# Patient Record
Sex: MALE | Race: BLACK OR AFRICAN AMERICAN | Employment: UNEMPLOYED | URBAN - METROPOLITAN AREA
[De-identification: names, ages, dates, MRNs, and addresses within clinical notes are randomized per-mention and may not be internally consistent; named-entity substitution may affect disease eponyms.]

---

## 2021-01-01 ENCOUNTER — HOSPITAL ENCOUNTER (EMERGENCY)
Age: 51
End: 2021-07-23
Attending: EMERGENCY MEDICINE
Payer: COMMERCIAL

## 2021-01-01 DIAGNOSIS — Z85.048 HISTORY OF COLORECTAL CANCER: ICD-10-CM

## 2021-01-01 DIAGNOSIS — I46.9 CARDIOPULMONARY ARREST (HCC): Primary | ICD-10-CM

## 2021-01-01 DIAGNOSIS — Z86.39 HISTORY OF DIABETES MELLITUS: ICD-10-CM

## 2021-01-01 PROCEDURE — 92950 HEART/LUNG RESUSCITATION CPR: CPT

## 2021-01-01 PROCEDURE — 74011000250 HC RX REV CODE- 250: Performed by: EMERGENCY MEDICINE

## 2021-01-01 PROCEDURE — 99284 EMERGENCY DEPT VISIT MOD MDM: CPT

## 2021-01-01 PROCEDURE — 74011250636 HC RX REV CODE- 250/636: Performed by: EMERGENCY MEDICINE

## 2021-01-01 PROCEDURE — 82947 ASSAY GLUCOSE BLOOD QUANT: CPT

## 2021-01-01 RX ORDER — ATROPINE SULFATE 0.1 MG/ML
INJECTION INTRAVENOUS
Status: COMPLETED | OUTPATIENT
Start: 2021-01-01 | End: 2021-01-01

## 2021-01-01 RX ORDER — SODIUM BICARBONATE 1 MEQ/ML
SYRINGE (ML) INTRAVENOUS
Status: COMPLETED | OUTPATIENT
Start: 2021-01-01 | End: 2021-01-01

## 2021-01-01 RX ORDER — EPINEPHRINE 0.1 MG/ML
INJECTION INTRACARDIAC; INTRAVENOUS
Status: COMPLETED | OUTPATIENT
Start: 2021-01-01 | End: 2021-01-01

## 2021-01-01 RX ORDER — MAGNESIUM SULFATE HEPTAHYDRATE 40 MG/ML
INJECTION, SOLUTION INTRAVENOUS
Status: COMPLETED | OUTPATIENT
Start: 2021-01-01 | End: 2021-01-01

## 2021-01-01 RX ORDER — CALCIUM CHLORIDE INJECTION 100 MG/ML
INJECTION, SOLUTION INTRAVENOUS
Status: COMPLETED | OUTPATIENT
Start: 2021-01-01 | End: 2021-01-01

## 2021-01-01 RX ORDER — AMIODARONE HYDROCHLORIDE 150 MG/3ML
INJECTION, SOLUTION INTRAVENOUS
Status: COMPLETED | OUTPATIENT
Start: 2021-01-01 | End: 2021-01-01

## 2021-01-01 RX ADMIN — EPINEPHRINE 1 MG: 0.1 INJECTION, SOLUTION ENDOTRACHEAL; INTRACARDIAC; INTRAVENOUS at 19:39

## 2021-01-01 RX ADMIN — MAGNESIUM SULFATE HEPTAHYDRATE 2 G: 40 INJECTION, SOLUTION INTRAVENOUS at 19:40

## 2021-01-01 RX ADMIN — AMIODARONE HYDROCHLORIDE 300 MG: 50 INJECTION, SOLUTION INTRAVENOUS at 19:31

## 2021-01-01 RX ADMIN — EPINEPHRINE 1 MG: 0.1 INJECTION, SOLUTION ENDOTRACHEAL; INTRACARDIAC; INTRAVENOUS at 19:33

## 2021-01-01 RX ADMIN — EPINEPHRINE 1 MG: 0.1 INJECTION, SOLUTION ENDOTRACHEAL; INTRACARDIAC; INTRAVENOUS at 19:29

## 2021-01-01 RX ADMIN — CALCIUM CHLORIDE 1 G: 100 INJECTION, SOLUTION INTRAVENOUS at 19:27

## 2021-01-01 RX ADMIN — EPINEPHRINE 1 MG: 0.1 INJECTION, SOLUTION ENDOTRACHEAL; INTRACARDIAC; INTRAVENOUS at 19:41

## 2021-01-01 RX ADMIN — EPINEPHRINE 1 MG: 0.1 INJECTION, SOLUTION ENDOTRACHEAL; INTRACARDIAC; INTRAVENOUS at 19:36

## 2021-01-01 RX ADMIN — SODIUM BICARBONATE 100 MEQ: 84 INJECTION INTRAVENOUS at 19:34

## 2021-01-01 RX ADMIN — SODIUM BICARBONATE 50 MEQ: 84 INJECTION INTRAVENOUS at 19:34

## 2021-01-01 RX ADMIN — ATROPINE SULFATE 1 MG: 0.1 INJECTION, SOLUTION ENDOTRACHEAL; INTRAMUSCULAR; INTRAVENOUS; SUBCUTANEOUS at 19:31

## 2021-01-01 RX ADMIN — SODIUM BICARBONATE 50 MEQ: 84 INJECTION INTRAVENOUS at 19:26

## 2021-01-01 RX ADMIN — EPINEPHRINE 1 MG: 0.1 INJECTION, SOLUTION ENDOTRACHEAL; INTRACARDIAC; INTRAVENOUS at 19:26

## 2021-07-22 NOTE — ED PROVIDER NOTES
[address]  EMERGENCY DEPARTMENT HISTORY AND PHYSICAL EXAM    7:51 PM    Date: 7/22/2021  Patient Name: Narciso Kowalski    History of Presenting Illness     Chief Complaint   Patient presents with    Cardiac arrest       History Provided By: Patient  Location/Duration/Severity/Modifying factors   Patient is a 44-year-old male with a past medical history of diabetes, colorectal cancer, status post bilateral BKA's as well as other noted past medical history who is presenting to the emergency department today with a chief complaint of cardiopulmonary arrest.  The patient reportedly was at home, per EMS patient was complaining of feeling unwell and per the family they indicated that the bystander indicated the patient's \"eyes rolled back\". Patient evidently came around and spoke with the patient. Indicated the patient was alert at that time. In route the patient had a further cardiac arrest and lost pulses. He underwent defibrillation on 2 occasions received 3 mg epinephrine and an advanced airway was inserted. History thus is further limited due to the patient being unresponsive. The family, in retrospect provided the history that the patient has been complaining of left thigh pain earlier in the day today. Per EMS the patient initially was in a pulseless electrical activity. PCP: Carlyn Sesay, DO    Current Outpatient Medications   Medication Sig Dispense Refill    metFORMIN (GLUCOPHAGE) 500 mg tablet Take 1 Tab by mouth two (2) times daily (with meals). 60 Tab 0       Past History     Past Medical History:  Past Medical History:   Diagnosis Date    Diabetes ([address]        Past Surgical History:  Past Surgical History:   Procedure Laterality Date    HX ORTHOPAEDIC      left knee, left elbow       Family History:  History reviewed. No pertinent family history.     Social History:  Social History     Tobacco Use    Smoking status: Never Smoker   Substance Use Topics    Alcohol use: No    Drug use: No       Allergies:  No Known Allergies    I reviewed and confirmed the above information with patient and updated as necessary. Review of Systems     Review of Systems   Unable to perform ROS: Patient unresponsive       Physical Exam   There were no vitals taken for this visit. Physical Exam  Constitutional:       Appearance: He is ill-appearing. Comments: Completely unresponsive, undergoing chest compressions ill-appearing   HENT:      Head: Normocephalic and atraumatic. Right Ear: External ear normal.      Left Ear: External ear normal.      Nose: Nose normal.      Mouth/Throat:      Comments: Advanced airway in place  Eyes:      Conjunctiva/sclera: Conjunctivae normal.      Comments: Pupils are approximately 5 mm and unreactive to light   Cardiovascular:      Pulses: Normal pulses. Comments: No cardiac activity was auscultated  Pulmonary:      Comments: Symmetric mechanical sounding breath sounds, no spontaneous respirations  Abdominal:      General: Abdomen is flat. Comments: Abdominal scar in the right upper quadrant, ostomy bag in the left lower quadrant   Musculoskeletal:         General: Normal range of motion. Cervical back: Neck supple. Skin:     General: Skin is warm and dry. Neurological:      Comments: Unresponsive, no gag, no corneal reflex. No spontaneous movement. GCS of 3. Diagnostic Study Results     Labs -  No results found for this or any previous visit (from the past 24 hour(s)). Radiologic Studies -   No orders to display           Medical Decision Making   I am the first provider for this patient. I reviewed the vital signs, available nursing notes, past medical history, past surgical history, family history and social history. Vital Signs-Reviewed the patient's vital signs.     EKG: N/A    Records Reviewed: Nursing Notes and Old Medical Records (Time of Review: 7:51 PM)    ED Course: Progress Notes, Reevaluation, and Consults:         Provider Notes (Medical Decision Making):   MDM  Number of Diagnoses or Management Options  Cardiopulmonary arrest ([facility] [address]  History of colorectal cancer  History of diabetes mellitus  Diagnosis management comments: 58-year-old male who presents to the ED  in cardiac arrest.  Patient received approximately 3 mg of epinephrine prior to arrival as well as defibrillation x2. Differential diagnosis for cardiac arrest would include pulseless electrical activity, pulmonary embolism, V. fib arrest secondary to myocardial ischemia/STEMI, hypoxia, hyperkalemia or other electrolyte derangement, etc.    Please see medication ministration record for medication totals. Patient arrived in pulseless electrical activity he did convert into what appeared to be a polymorphic ventricular tachycardia versus a coarse V. fib for which he received 360 J of defibrillation for. Patient was persistently in PEA and intermittently in asystole and agonal rhythm. Patient had an advanced airway, the eye gel in place. It seemed to be working appropriately as we are getting readings on the end-tidal capnography which were appropriate in the 20s, given the arrest circumstance. I felt that it would be unnecessary to provide endotracheal intubation given her working airway and I did not want to interrupt chest compressions. Labs that were obtained during the rest are consistent with cardiac arrest, no clear defined etiology. The patient was given a total of around 10 doses of epinephrine. On bedside point-of-care ultrasound there was no cardiac activity seen.   He had extremely low chance of meaningful survival and recovery at that point and no recovery of spontaneous circulation and I felt it would be futile to continue resuscitative efforts and ultimately terminate resuscitation at 1947, at which time I found the patient to have no spontaneous respirations, no peripheral or central pulses, no corneal reflex no spontaneous movement. .    Family was notified in the presence of the  of the patient's passing. Bedside US    Date/Time: 2021 12:44 AM  Performed by: Dashawn Long DO  Authorized by: Dashawn Long DO     Written consent obtained: No    Verbal consent obtained: No    Emergent situation    Performed by: Attending  Supervising provider:  Same  Type of procedure: Focused cardiac (Echo)  Left leg: Indications:  Cardiac arrest    Subxiphoid (4 chamber): Adequate    Apical four-chamber:  Limited    Pericardial effusion:  Absent    Global Ventricular Function:  Asystole    Interpretation:  No sonographic evidence of significant pericardial effusion and No cardiac activity/ Cardiac standstill  Critical Care  Performed by: Dashawn Long DO  Authorized by: Dashawn Long DO     Critical care provider statement:     Critical care time (minutes):  35    Critical care time was exclusive of:  Separately billable procedures and treating other patients    Critical care was necessary to treat or prevent imminent or life-threatening deterioration of the following conditions:  Cardiac failure, circulatory failure and respiratory failure    Critical care was time spent personally by me on the following activities:  Evaluation of patient's response to treatment, ordering and performing treatments and interventions, ordering and review of laboratory studies, pulse oximetry and examination of patient    I assumed direction of critical care for this patient from another provider in my specialty: no    Comments:      Critical care time also includes of counseling the family after the patient's passing, coordination of care and medical direction to EMS prior to the patient's arrival.            Diagnosis     Clinical Impression: No diagnosis found.     Disposition:     Follow-up Information    None          Patient's Medications   Start Taking    No medications on file   Continue Taking METFORMIN (GLUCOPHAGE) 500 MG TABLET    Take 1 Tab by mouth two (2) times daily (with meals). These Medications have changed    No medications on file   Stop Taking    No medications on file       Khadijah Apple DO   Emergency Medicine   July 23, 2021, 7:51 PM     This note is dictated utilizing Dragon voice recognition software. Unfortunately this leads to occasional typographical errors using the voice recognition. I apologize in advance if the situation occurs. If questions occur please do not hesitate to contact me directly.     Khadijah Apple, DO

## 2021-07-23 NOTE — ED TRIAGE NOTES
Patient arrived via ems after being called for weakness, diaphoresis. Friends gave some oj because they thought his sugar was low. Upon ems arrival, patient became unresponsive with agonal breathing. V fib noted and patient def x2 en route along with epi x3.

## 2021-07-23 NOTE — ED NOTES
Postmortem care completed, [facility] notified at this time. Per [facility] coordinator hold pt for eye donation, may send body to morgue only, do not send to  home.  notified.

## 2021-07-23 NOTE — PROGRESS NOTES
Bereavement Note:     responded to the death of Hermes Leigh, who is a 45 y.o., male, offering Spiritual Care to patient and family, see flow sheets for interventions. Date of Death: 2021    Extended Emergency Contact Information  Primary Emergency Contact: Asia Torres  Address: [address], [facility] [address] Phone: [phone]  Relation: Spouse                 YES      NO  UNKNOWN  [facility]   []        []    [x]   Eye Bank   [x] [] []   Medical Examiner  []        [x]  []   Going to [employer]  [x]        [] []      Autopsy   []        [x]         []   Sympathy Card  [x]        []  Bereavement Materials  [x]        []           Business Card Provided  [x]        []              Home:     [facility]pl[facility] will continue to follow family and will provide spiritual care as needed.        [address]   [phone] - [place]
